# Patient Record
Sex: FEMALE | Race: WHITE | NOT HISPANIC OR LATINO | Employment: FULL TIME | URBAN - METROPOLITAN AREA
[De-identification: names, ages, dates, MRNs, and addresses within clinical notes are randomized per-mention and may not be internally consistent; named-entity substitution may affect disease eponyms.]

---

## 2020-04-27 DIAGNOSIS — Z78.0 MENOPAUSE: Primary | ICD-10-CM

## 2020-04-27 RX ORDER — ESTRADIOL 1 MG/G
GEL TOPICAL DAILY
COMMUNITY
End: 2020-10-14 | Stop reason: SDUPTHER

## 2020-04-27 RX ORDER — ESTRADIOL 1 MG/1
1 TABLET ORAL DAILY
Qty: 90 TABLET | Refills: 3 | Status: SHIPPED | OUTPATIENT
Start: 2020-04-27 | End: 2020-04-27 | Stop reason: SDUPTHER

## 2020-04-27 RX ORDER — ESTRADIOL 1 MG/1
1 TABLET ORAL DAILY
COMMUNITY
End: 2020-04-27 | Stop reason: SDUPTHER

## 2020-04-27 RX ORDER — ESTRADIOL 1 MG/1
1 TABLET ORAL DAILY
Qty: 90 TABLET | Refills: 3 | Status: SHIPPED | OUTPATIENT
Start: 2020-04-27 | End: 2020-10-07 | Stop reason: SDUPTHER

## 2020-04-27 RX ORDER — VALACYCLOVIR HYDROCHLORIDE 500 MG/1
500 TABLET, FILM COATED ORAL 2 TIMES DAILY
COMMUNITY
End: 2021-06-24 | Stop reason: SDUPTHER

## 2020-04-27 RX ORDER — PROGESTERONE 100 MG/1
100 CAPSULE ORAL DAILY
COMMUNITY
End: 2021-02-03 | Stop reason: SDUPTHER

## 2020-04-27 RX ORDER — DEXTROAMPHETAMINE SACCHARATE, AMPHETAMINE ASPARTATE, DEXTROAMPHETAMINE SULFATE AND AMPHETAMINE SULFATE 7.5; 7.5; 7.5; 7.5 MG/1; MG/1; MG/1; MG/1
TABLET ORAL
COMMUNITY
End: 2024-04-01

## 2020-04-27 RX ORDER — PHENTERMINE AND TOPIRAMATE 3.75; 23 MG/1; MG/1
CAPSULE, EXTENDED RELEASE ORAL
COMMUNITY

## 2020-04-27 NOTE — TELEPHONE ENCOUNTER
PATIENT REQUESTING REFILL ON ESTRADIOL. ALLERGIES MARKED AS REVIEWED. MEDICATION PENDING. PHARMACY UP TO DATE. PLEASE ADVISE.

## 2020-10-07 DIAGNOSIS — Z78.0 MENOPAUSE: ICD-10-CM

## 2020-10-07 RX ORDER — ESTRADIOL 1 MG/1
1 TABLET ORAL DAILY
Qty: 90 TABLET | Refills: 3 | Status: SHIPPED | OUTPATIENT
Start: 2020-10-07 | End: 2020-10-14 | Stop reason: SDUPTHER

## 2020-10-07 RX ORDER — ESTRADIOL 1 MG/1
1 TABLET ORAL DAILY
Qty: 90 TABLET | Refills: 3 | Status: CANCELLED | OUTPATIENT
Start: 2020-10-07

## 2020-10-07 NOTE — TELEPHONE ENCOUNTER
----- Message from Ronda Padilla sent at 10/7/2020  9:05 AM CDT -----  Regarding: RX Refill Request  Type:  RX Refill Request    Who Called:  Kyleigh Moore   Refill or New Rx: refill  RX Name and Strength:estradiol ,  How is the patient currently taking it? (ex. 1XDay):  Is this a 30 day or 90 day RX:  Preferred Pharmacy with phone number: [facility]  Local or Mail Order:  Ordering Provider:  Would the patient rather a call back or a response via [facility]?   Best Call Back Number:  Additional Information:

## 2020-10-13 DIAGNOSIS — Z78.0 MENOPAUSE: ICD-10-CM

## 2020-10-13 RX ORDER — ESTRADIOL 1 MG/1
1 TABLET ORAL DAILY
Qty: 90 TABLET | Refills: 3 | Status: CANCELLED | OUTPATIENT
Start: 2020-10-13

## 2020-10-14 DIAGNOSIS — Z78.0 MENOPAUSE: ICD-10-CM

## 2020-10-14 RX ORDER — ESTRADIOL 1 MG/1
1 TABLET ORAL DAILY
Qty: 90 TABLET | Refills: 3 | Status: SHIPPED | OUTPATIENT
Start: 2020-10-14 | End: 2020-10-14 | Stop reason: SDUPTHER

## 2020-10-14 RX ORDER — ESTRADIOL 1 MG/1
1 TABLET ORAL DAILY
Qty: 90 TABLET | Refills: 3 | Status: SHIPPED | OUTPATIENT
Start: 2020-10-14 | End: 2021-10-20

## 2020-10-14 RX ORDER — ESTRADIOL 1 MG/G
1 GEL TOPICAL DAILY
Qty: 90 G | Refills: 3 | Status: SHIPPED | OUTPATIENT
Start: 2020-10-14 | End: 2020-10-14

## 2021-02-03 DIAGNOSIS — Z79.890 HORMONE REPLACEMENT THERAPY (HRT): Primary | ICD-10-CM

## 2021-02-03 RX ORDER — PROGESTERONE 100 MG/1
100 CAPSULE ORAL DAILY
Qty: 90 CAPSULE | Refills: 3 | Status: SHIPPED | OUTPATIENT
Start: 2021-02-03 | End: 2022-03-07

## 2021-04-20 ENCOUNTER — OFFICE VISIT (OUTPATIENT)
Dept: OBSTETRICS AND GYNECOLOGY | Facility: CLINIC | Age: 51
End: 2021-04-20
Payer: COMMERCIAL

## 2021-04-20 VITALS
BODY MASS INDEX: 21.99 KG/M2 | DIASTOLIC BLOOD PRESSURE: 80 MMHG | HEART RATE: 69 BPM | SYSTOLIC BLOOD PRESSURE: 127 MMHG | WEIGHT: 132 LBS | HEIGHT: 65 IN

## 2021-04-20 DIAGNOSIS — F32.A ANXIETY AND DEPRESSION: ICD-10-CM

## 2021-04-20 DIAGNOSIS — Z12.11 SPECIAL SCREENING FOR MALIGNANT NEOPLASM OF COLON: ICD-10-CM

## 2021-04-20 DIAGNOSIS — Z01.419 WELL WOMAN EXAM WITH ROUTINE GYNECOLOGICAL EXAM: Primary | ICD-10-CM

## 2021-04-20 DIAGNOSIS — F41.9 ANXIETY AND DEPRESSION: ICD-10-CM

## 2021-04-20 DIAGNOSIS — Z12.31 BREAST CANCER SCREENING BY MAMMOGRAM: ICD-10-CM

## 2021-04-20 PROCEDURE — 99396 PREV VISIT EST AGE 40-64: CPT | Mod: 25,S$GLB,, | Performed by: OBSTETRICS & GYNECOLOGY

## 2021-04-20 PROCEDURE — 99396 PR PREVENTIVE VISIT,EST,40-64: ICD-10-PCS | Mod: 25,S$GLB,, | Performed by: OBSTETRICS & GYNECOLOGY

## 2021-04-20 PROCEDURE — 82274 PR  BLOOD,OCCULT,FECAL HGB,FECES,1-3 SIMULT: ICD-10-PCS | Mod: QW,S$GLB,, | Performed by: OBSTETRICS & GYNECOLOGY

## 2021-04-20 PROCEDURE — 82274 ASSAY TEST FOR BLOOD FECAL: CPT | Mod: QW,S$GLB,, | Performed by: OBSTETRICS & GYNECOLOGY

## 2021-04-20 RX ORDER — ALPRAZOLAM 0.5 MG/1
0.5 TABLET ORAL 3 TIMES DAILY PRN
Qty: 28 TABLET | Refills: 0 | Status: SHIPPED | OUTPATIENT
Start: 2021-04-20 | End: 2023-03-20

## 2021-04-20 RX ORDER — THYROID, PORCINE 30 MG/1
TABLET ORAL
COMMUNITY
Start: 2021-04-15

## 2021-04-20 RX ORDER — ESCITALOPRAM OXALATE 10 MG/1
TABLET ORAL
Qty: 90 TABLET | Refills: 3 | Status: SHIPPED | OUTPATIENT
Start: 2021-04-20 | End: 2023-03-20

## 2021-06-24 RX ORDER — VALACYCLOVIR HYDROCHLORIDE 500 MG/1
500 TABLET, FILM COATED ORAL 2 TIMES DAILY
Qty: 60 TABLET | Refills: 2 | Status: SHIPPED | OUTPATIENT
Start: 2021-06-24 | End: 2023-01-23

## 2021-12-08 ENCOUNTER — TELEPHONE (OUTPATIENT)
Dept: OBSTETRICS AND GYNECOLOGY | Facility: CLINIC | Age: 51
End: 2021-12-08
Payer: COMMERCIAL

## 2021-12-14 ENCOUNTER — OFFICE VISIT (OUTPATIENT)
Dept: OBSTETRICS AND GYNECOLOGY | Facility: CLINIC | Age: 51
End: 2021-12-14
Payer: COMMERCIAL

## 2021-12-14 VITALS
BODY MASS INDEX: 22.3 KG/M2 | HEART RATE: 73 BPM | WEIGHT: 134 LBS | DIASTOLIC BLOOD PRESSURE: 82 MMHG | SYSTOLIC BLOOD PRESSURE: 137 MMHG

## 2021-12-14 DIAGNOSIS — Z12.31 BREAST CANCER SCREENING BY MAMMOGRAM: ICD-10-CM

## 2021-12-14 DIAGNOSIS — N39.3 SUI (STRESS URINARY INCONTINENCE, FEMALE): ICD-10-CM

## 2021-12-14 DIAGNOSIS — N76.2 ACUTE VULVITIS: Primary | ICD-10-CM

## 2021-12-14 PROCEDURE — 99213 PR OFFICE/OUTPT VISIT, EST, LEVL III, 20-29 MIN: ICD-10-PCS | Mod: S$GLB,,, | Performed by: OBSTETRICS & GYNECOLOGY

## 2021-12-14 PROCEDURE — 99213 OFFICE O/P EST LOW 20 MIN: CPT | Mod: S$GLB,,, | Performed by: OBSTETRICS & GYNECOLOGY

## 2021-12-14 RX ORDER — CLOBETASOL PROPIONATE 0.5 MG/G
OINTMENT TOPICAL 2 TIMES DAILY
Qty: 15 G | Refills: 0 | Status: SHIPPED | OUTPATIENT
Start: 2021-12-14 | End: 2023-03-20

## 2022-08-02 ENCOUNTER — TELEPHONE (OUTPATIENT)
Dept: OBSTETRICS AND GYNECOLOGY | Facility: CLINIC | Age: 52
End: 2022-08-02
Payer: COMMERCIAL

## 2022-08-02 NOTE — TELEPHONE ENCOUNTER
Attempted to contact patient, no answer, left voicemail advising patient to go to urgent care as Dr Phillips is out of the office this week. Requested a call back if patient had any further questions.

## 2022-08-02 NOTE — TELEPHONE ENCOUNTER
----- Message from Stephen Barton sent at 8/2/2022 11:12 AM CDT -----  Contact: Patient  Patient need a sooner appointment.  Patient has a possible UTI or bladder infection    Please call patient to schedule   Call back # [phone]

## 2022-08-05 ENCOUNTER — TELEPHONE (OUTPATIENT)
Dept: OBSTETRICS AND GYNECOLOGY | Facility: CLINIC | Age: 52
End: 2022-08-05
Payer: COMMERCIAL

## 2022-08-05 NOTE — TELEPHONE ENCOUNTER
----- Message from Nanette Beard sent at 8/5/2022  1:47 PM CDT -----  pt needs call back to schedule procedure, discussed awhile back...[phone] (home)

## 2022-08-08 DIAGNOSIS — N39.3 SUI (STRESS URINARY INCONTINENCE, FEMALE): Primary | ICD-10-CM

## 2023-03-20 ENCOUNTER — OFFICE VISIT (OUTPATIENT)
Dept: OBSTETRICS AND GYNECOLOGY | Facility: CLINIC | Age: 53
End: 2023-03-20
Payer: COMMERCIAL

## 2023-03-20 ENCOUNTER — TELEPHONE (OUTPATIENT)
Dept: GASTROENTEROLOGY | Facility: CLINIC | Age: 53
End: 2023-03-20

## 2023-03-20 VITALS
WEIGHT: 137 LBS | HEIGHT: 65 IN | HEART RATE: 79 BPM | SYSTOLIC BLOOD PRESSURE: 142 MMHG | BODY MASS INDEX: 22.82 KG/M2 | DIASTOLIC BLOOD PRESSURE: 92 MMHG

## 2023-03-20 DIAGNOSIS — Z12.31 BREAST CANCER SCREENING BY MAMMOGRAM: ICD-10-CM

## 2023-03-20 DIAGNOSIS — Z12.11 SPECIAL SCREENING FOR MALIGNANT NEOPLASM OF COLON: ICD-10-CM

## 2023-03-20 DIAGNOSIS — Z01.419 WELL WOMAN EXAM WITH ROUTINE GYNECOLOGICAL EXAM: ICD-10-CM

## 2023-03-20 DIAGNOSIS — E03.8 OTHER SPECIFIED HYPOTHYROIDISM: ICD-10-CM

## 2023-03-20 DIAGNOSIS — N39.3 SUI (STRESS URINARY INCONTINENCE, FEMALE): ICD-10-CM

## 2023-03-20 DIAGNOSIS — Z12.11 COLON CANCER SCREENING: Primary | ICD-10-CM

## 2023-03-20 PROCEDURE — 99396 PR PREVENTIVE VISIT,EST,40-64: ICD-10-PCS | Mod: S$GLB,,, | Performed by: OBSTETRICS & GYNECOLOGY

## 2023-03-20 PROCEDURE — 99396 PREV VISIT EST AGE 40-64: CPT | Mod: S$GLB,,, | Performed by: OBSTETRICS & GYNECOLOGY

## 2023-03-20 RX ORDER — ZOLPIDEM TARTRATE 10 MG/1
TABLET ORAL
COMMUNITY
Start: 2023-02-06

## 2023-03-20 RX ORDER — HYDROCHLOROTHIAZIDE 12.5 MG/1
12.5 TABLET ORAL EVERY MORNING
COMMUNITY
Start: 2023-03-13

## 2023-03-20 NOTE — PROGRESS NOTES
Subjective:       Patient ID: Kyleigh Moore is a 52 y.o. female.    Chief Complaint:  Well Woman      History of Present Illness  She is doing well.  No new health issues,  No abnormal bleeding, No GI  concerns,  No dyspareunia.   Doing well but she has PARISH with activity cough running or laugh   she is interested in a sling.  She is on thyroid and would like to check her labs    she will do this    finally would like to get a colon evaluation.   No dyspareunia.    HPI      GYN & OB History  No LMP recorded. Patient has had a hysterectomy.     Date of Last Pap: No result found    OB History   No obstetric history on file.       Review of Systems  Review of Systems   Unable to perform ROS  Genitourinary:  Positive for bladder incontinence.           Objective:    Physical Exam:   Constitutional: She is oriented to person, place, and time. She appears well-developed.    HENT:   Head: Normocephalic.     Neck: Trachea normal. No thyromegaly present.    Cardiovascular:  Normal rate, regular rhythm and normal heart sounds.             Pulmonary/Chest: Effort normal and breath sounds normal. Right breast exhibits no mass, no nipple discharge and no skin change. Left breast exhibits no mass, no nipple discharge and no skin change.        Abdominal: Soft. There is no abdominal tenderness. There is no rigidity and no guarding.     Genitourinary:    Vagina and rectum normal.   Rectum:      Guaiac result negative.   Guaiac negative stool.    Pelvic exam was performed with patient supine.   There is no lesion on the right labia. There is no lesion on the left labia. Right adnexum displays no mass and no tenderness. Left adnexum displays no mass and no tenderness. No  no vaginal discharge in the vagina. Cervix is absent.Uterus is absent.    Genitourinary Comments: Hyper mobile urethra on cough.                   Lymphadenopathy:        Head (right side): No submental and no submandibular adenopathy present.        Head (left  side): No submental and no submandibular adenopathy present.     She has no cervical adenopathy.    Neurological: She is alert and oriented to person, place, and time.    Skin: Skin is warm.    Psychiatric: She has a normal mood and affect. Her speech is normal and behavior is normal. Thought content normal.        Assessment:        1. Colon cancer screening    2. Breast cancer screening by mammogram    3. Well woman exam with routine gynecological exam    4. Special screening for malignant neoplasm of colon    5. PARISH (stress urinary incontinence, female)    6. Other specified hypothyroidism               Plan:             Pap   Mammogram  Follow up one year or sooner if needed  Self breast exams  Consider health profile if labs not done with PCP  Recommend colonoscopy as indicated  Bone density discussed   Pt is aware we call all results. If she does not hear from our office regarding her result within a week of having a study or procedure performed she is to call the office so that we can research the result for her as I do not know when she is scheduling her procedures.   Chaperone was present

## 2023-03-24 LAB — Lab: NORMAL

## 2023-03-29 RX ORDER — FLUCONAZOLE 150 MG/1
150 TABLET ORAL EVERY OTHER DAY
Qty: 2 TABLET | Refills: 2 | Status: SHIPPED | OUTPATIENT
Start: 2023-03-29 | End: 2023-04-01

## 2023-05-02 ENCOUNTER — TELEPHONE (OUTPATIENT)
Dept: GASTROENTEROLOGY | Facility: CLINIC | Age: 53
End: 2023-05-02
Payer: COMMERCIAL

## 2023-05-02 NOTE — TELEPHONE ENCOUNTER
----- Message from Codey Muniz MA sent at 5/1/2023  9:20 AM CDT -----  Contact: Kyleigh    ----- Message -----  From: Concha Baez  Sent: 4/25/2023   8:32 AM CDT  To: [facility] Procedure Scheduling    Kyleigh needs a call back at [phone], Regards to she is ready to schedule her colonoscopy with referral.    Thanks  Td

## 2023-05-03 ENCOUNTER — TELEPHONE (OUTPATIENT)
Dept: GASTROENTEROLOGY | Facility: CLINIC | Age: 53
End: 2023-05-03
Payer: COMMERCIAL

## 2023-05-03 VITALS — BODY MASS INDEX: 21.66 KG/M2 | WEIGHT: 130 LBS | HEIGHT: 65 IN

## 2023-05-03 DIAGNOSIS — Z12.11 SCREENING FOR COLON CANCER: Primary | ICD-10-CM

## 2023-05-03 NOTE — ADDENDUM NOTE
Addended by: FLORIDALMA BURGOS on: 5/3/2023 08:12 AM     Modules accepted: Orders, Level of Service

## 2023-05-03 NOTE — TELEPHONE ENCOUNTER
Returned call to pt and got chart updated and scheduled. Pt voiced no hx of cpcp, heart stent or walking problem. Pt voiced understood information will be emailed and link

## 2023-05-03 NOTE — TELEPHONE ENCOUNTER
----- Message from Bethanie Alberto sent at 5/3/2023  8:27 AM CDT -----  Regarding: Return Call  Contact: patient  Type:  Patient Returning Call    Who Called:Kyleigh   Who Left Message for Patient: nurse   Does the patient know what this is regarding?: scheduling colonoscopy  Would the patient rather a call back or a response via [facility]? Call back   Best Call Back Number: [phone] (home)     Additional Information:     CAMILLE Russ

## 2023-05-03 NOTE — TELEPHONE ENCOUNTER
----- Message from Dilia Olson LPN sent at 5/2/2023  2:53 PM CDT -----  Contact: pt  Returning call to be scheduled.    ----- Message -----  From: Susanne Vee  Sent: 5/2/2023   2:49 PM CDT  To: Valdemar TINOCO Staff    Pt returning a missed call and can be reached at [phone].    Thanks,

## 2023-05-14 DIAGNOSIS — Z79.890 HORMONE REPLACEMENT THERAPY (HRT): ICD-10-CM

## 2023-05-15 RX ORDER — SOD SULF/POT CHLORIDE/MAG SULF 1.479 G
12 TABLET ORAL DAILY
Qty: 24 TABLET | Refills: 0 | Status: SHIPPED | OUTPATIENT
Start: 2023-05-15

## 2023-05-16 RX ORDER — PROGESTERONE 100 MG/1
CAPSULE ORAL
Qty: 30 CAPSULE | Refills: 0 | Status: SHIPPED | OUTPATIENT
Start: 2023-05-16 | End: 2023-06-20

## 2023-06-19 DIAGNOSIS — Z79.890 HORMONE REPLACEMENT THERAPY (HRT): ICD-10-CM

## 2023-06-20 RX ORDER — PROGESTERONE 100 MG/1
CAPSULE ORAL
Qty: 90 CAPSULE | Refills: 3 | Status: SHIPPED | OUTPATIENT
Start: 2023-06-20 | End: 2024-04-01

## 2023-06-22 VITALS — WEIGHT: 130 LBS | HEIGHT: 65 IN | BODY MASS INDEX: 21.66 KG/M2

## 2023-06-22 DIAGNOSIS — Z12.11 SCREENING FOR MALIGNANT NEOPLASM OF COLON: Primary | ICD-10-CM

## 2023-06-22 NOTE — PROGRESS NOTES
"[facility]  [address] [place] [address]  Phone: [phone]  Fax: [phone]    History & Physical         Provider: Dr. Helen Aldrich    Patient Name: Kyleigh ARENAS (age):1970  52 y.o.           Gender: female   Phone: [phone]     Referring Physician: Black Macdonald Iii     Vital Signs:   Height - 5' 5"  Weight -  130 lb  BMI -  21.63    Plan: Colonoscopy @ [facility]    Encounter Diagnosis   Name Primary?    Screening for malignant neoplasm of colon Yes           History:      Past Medical History:   Diagnosis Date    ADD (attention deficit disorder)     Anxiety     BMI 21.0-21.9, adult     Hormone replacement therapy (HRT)     HSV infection     Hypertension     Thyroid disease       Past Surgical History:   Procedure Laterality Date    COLONOSCOPY  2013    HYSTERECTOMY      KNEE SURGERY Right     x2    SURGICAL REMOVAL OF LYMPH NODE      WISDOM TOOTH EXTRACTION        Medication List with Changes/Refills   Current Medications    ARMOUR THYROID 30 MG TAB        DEXTROAMPHETAMINE-AMPHETAMINE 30 MG TAB    Take by mouth.    ESTRADIOL (ESTRACE) 1 MG TABLET    Take 1 tablet (1 mg total) by mouth once daily.    HYDROCHLOROTHIAZIDE (HYDRODIURIL) 12.5 MG TAB    Take 12.5 mg by mouth every morning.    NEBIVOLOL HCL (BYSTOLIC ORAL)    Take by mouth.    PHENTERMINE-TOPIRAMATE (QSYMIA) 3.75-23 MG CM24    Take by mouth.     PHENTERMINE-TOPIRAMATE (QSYMIA) 7.5-46 MG CM24    Take by mouth.    PROGESTERONE (PROMETRIUM) 100 MG CAPSULE    TAKE 1 CAPSULE(100 MG) BY MOUTH EVERY DAY    SOD SULF-POT CHLORIDE-MAG SULF (SUTAB) 1.479-0.188- 0.225 GRAM TABLET    Take 12 tablets by mouth once daily. Take according to package instructions with indicated amount of water. No breakfast day before test. May substitute with Suprep, Clenpiq, Plenvu, Moviprep or GoLytely based on Rx plan and patient preference.    " VALACYCLOVIR (VALTREX) 500 MG TABLET    TAKE 1 TABLET(500 MG) BY MOUTH TWICE DAILY    ZOLPIDEM (AMBIEN) 10 MG TAB          Review of patient's allergies indicates:  No Known Allergies   Family History   Problem Relation Age of Onset    Brain cancer Mother 52    Heart attack Father 67    Liver disease Sister 37    No Known Problems Maternal Grandmother     No Known Problems Maternal Grandfather     No Known Problems Paternal Grandmother     No Known Problems Paternal Grandfather       Social History     Tobacco Use    Smoking status: Never    Smokeless tobacco: Never   Substance Use Topics    Alcohol use: Yes     Alcohol/week: 3.0 standard drinks     Types: 3 Glasses of wine per week    Drug use: Never        Physical Examination:     General Appearance:___________________________  HEENT: _____________________________________  Abdomen:____________________________________  Heart:________________________________________  Lungs:_______________________________________  Extremities:___________________________________  Skin:_________________________________________  Endocrine:____________________________________  Genitourinary:_________________________________  Neurological:__________________________________      Patient has been evaluated immediately prior to sedation and is medically cleared for endoscopy with IVCS as an ASA class: ______      Physician Signature: _________________________       Date: ________  Time: ________

## 2023-06-29 ENCOUNTER — OUTSIDE PLACE OF SERVICE (OUTPATIENT)
Dept: GASTROENTEROLOGY | Facility: CLINIC | Age: 53
End: 2023-06-29

## 2023-06-29 LAB — CRC RECOMMENDATION EXT: NORMAL

## 2023-06-29 PROCEDURE — G0121 COLON CA SCRN NOT HI RSK IND: ICD-10-PCS | Mod: ,,, | Performed by: INTERNAL MEDICINE

## 2023-06-29 PROCEDURE — G0121 COLON CA SCRN NOT HI RSK IND: HCPCS | Mod: ,,, | Performed by: INTERNAL MEDICINE

## 2023-12-15 ENCOUNTER — DOCUMENTATION ONLY (OUTPATIENT)
Dept: GASTROENTEROLOGY | Facility: CLINIC | Age: 53
End: 2023-12-15

## 2024-02-12 DIAGNOSIS — B00.1 RECURRENT COLD SORES: ICD-10-CM

## 2024-02-13 RX ORDER — VALACYCLOVIR HYDROCHLORIDE 500 MG/1
TABLET, FILM COATED ORAL
Qty: 60 TABLET | Refills: 2 | Status: SHIPPED | OUTPATIENT
Start: 2024-02-13

## 2024-02-23 DIAGNOSIS — Z78.0 MENOPAUSE: ICD-10-CM

## 2024-02-26 RX ORDER — ESTRADIOL 1 MG/1
1 TABLET ORAL DAILY
Qty: 90 TABLET | Refills: 3 | Status: SHIPPED | OUTPATIENT
Start: 2024-02-26 | End: 2024-04-01 | Stop reason: SDUPTHER

## 2024-04-01 ENCOUNTER — OFFICE VISIT (OUTPATIENT)
Dept: OBSTETRICS AND GYNECOLOGY | Facility: CLINIC | Age: 54
End: 2024-04-01
Payer: COMMERCIAL

## 2024-04-01 VITALS
DIASTOLIC BLOOD PRESSURE: 80 MMHG | HEART RATE: 76 BPM | SYSTOLIC BLOOD PRESSURE: 116 MMHG | BODY MASS INDEX: 23.43 KG/M2 | WEIGHT: 140.63 LBS | HEIGHT: 65 IN

## 2024-04-01 DIAGNOSIS — Z12.31 BREAST CANCER SCREENING BY MAMMOGRAM: Primary | ICD-10-CM

## 2024-04-01 DIAGNOSIS — Z01.419 WELL WOMAN EXAM WITH ROUTINE GYNECOLOGICAL EXAM: ICD-10-CM

## 2024-04-01 DIAGNOSIS — N39.3 SUI (STRESS URINARY INCONTINENCE, FEMALE): ICD-10-CM

## 2024-04-01 DIAGNOSIS — Z78.0 MENOPAUSE: ICD-10-CM

## 2024-04-01 PROCEDURE — 99396 PREV VISIT EST AGE 40-64: CPT | Mod: S$GLB,,, | Performed by: OBSTETRICS & GYNECOLOGY

## 2024-04-01 RX ORDER — ESTRADIOL 1 MG/1
1 TABLET ORAL DAILY
Qty: 90 TABLET | Refills: 3 | Status: SHIPPED | OUTPATIENT
Start: 2024-04-01

## 2024-04-01 RX ORDER — NEBIVOLOL 10 MG/1
10 TABLET ORAL
COMMUNITY

## 2024-04-01 RX ORDER — DEXTROAMPHETAMINE SACCHARATE, AMPHETAMINE ASPARTATE MONOHYDRATE, DEXTROAMPHETAMINE SULFATE AND AMPHETAMINE SULFATE 6.25; 6.25; 6.25; 6.25 MG/1; MG/1; MG/1; MG/1
CAPSULE, EXTENDED RELEASE ORAL
COMMUNITY
Start: 2024-03-07

## 2024-04-01 NOTE — PROGRESS NOTES
Subjective:       Patient ID: Kyleigh Moore is a 53 y.o. female.    Chief Complaint:  Well Woman      History of Present Illness  She is doing well.  No new health issues,  No abnormal bleeding, No GI or Gu concerns,  No dyspareunia.   Rare hot flashes.  She is on progesterone and estradiol  she tried the pellets and did not like   she had the progesterone increased and she could not stay up.  She was also told to go up on her thyroid. She also had  testosterone.    She did have urinary incontinence.   With activity.  She is no long running secondary to her knees  she has some urgency.  Her colon eval was this past summer   she is on HRT   on ambien   she has shoulder and back pain from her breast   HPI      GYN & OB History  No LMP recorded. Patient has had a hysterectomy.     Date of Last Pap: No result found    OB History   No obstetric history on file.       Review of Systems  Review of Systems   Constitutional:  Negative for activity change.   Eyes:  Negative for visual disturbance.   Respiratory:  Negative for shortness of breath.    Cardiovascular:  Negative for chest pain.   Gastrointestinal:  Negative for abdominal pain.   Genitourinary:  Negative for vaginal bleeding.        No abnormal vaginal bleeding   Musculoskeletal:  Negative for back pain.   Integumentary:  Negative for rash and breast mass.   Neurological:  Negative for numbness.   Psychiatric/Behavioral:          No mood disturbance or changes    Breast: Negative for mass            Objective:    Physical Exam:   Constitutional: She is oriented to person, place, and time. She appears well-developed.    HENT:   Head: Normocephalic.     Neck: Trachea normal. No thyromegaly present.    Cardiovascular:  Normal rate, regular rhythm and normal heart sounds.             Pulmonary/Chest: Effort normal and breath sounds normal. Right breast exhibits no mass, no nipple discharge and no skin change. Left breast exhibits no mass, no nipple discharge and  no skin change.   Mild fibrocystic changes    During the exam self breast exam discussed         Abdominal: Soft. There is no abdominal tenderness. There is no rigidity and no guarding.     Genitourinary:    Vagina normal.      Pelvic exam was performed with patient supine.     Labial bartholins normal.There is no lesion on the right labia. There is no lesion on the left labia. Right adnexum displays no mass and no tenderness. Left adnexum displays no mass and no tenderness. Cervix is absent.Uterus is absent.              Lymphadenopathy:        Head (right side): No submental and no submandibular adenopathy present.        Head (left side): No submental and no submandibular adenopathy present.     She has no cervical adenopathy.    Neurological: She is alert and oriented to person, place, and time.    Skin: Skin is warm.    Psychiatric: She has a normal mood and affect. Her speech is normal and behavior is normal. Thought content normal.    Shoulder indention from her back      Assessment:        1. Breast cancer screening by mammogram    2. Well woman exam with routine gynecological exam    3. PARISH (stress urinary incontinence, female)               Plan:         Consider stopping the progesterone      Pap preformed as she had an ASCUS     she has PARISH at times   she will let me know when she is ready for surgery.  Conservative approaches discussed   she does have upper back concerns and shoulder secondary to her large breast   consider seeing a breast surgeon.    Mammogram  Follow up one year or sooner if needed  Self breast exams  Consider health profile if labs not done with PCP  Recommend colonoscopy as indicated  Bone density discussed   Pt is aware we call all results. If she does not hear from our office regarding her result within a week of having a study or procedure performed she is to call the office so that we can research the result for her as I do not know when she is scheduling her procedures.    Chaperone was present

## 2024-04-02 LAB — Lab: NORMAL

## 2024-04-24 ENCOUNTER — TELEPHONE (OUTPATIENT)
Dept: OBSTETRICS AND GYNECOLOGY | Facility: CLINIC | Age: 54
End: 2024-04-24
Payer: COMMERCIAL

## 2024-04-24 DIAGNOSIS — N39.3 SUI (STRESS URINARY INCONTINENCE, FEMALE): Primary | ICD-10-CM

## 2024-04-24 NOTE — TELEPHONE ENCOUNTER
LM on patient's voicemail advising her that Jaqueline will be calling her to get the surgery scheduled.

## 2024-04-24 NOTE — TELEPHONE ENCOUNTER
----- Message from Bethanie Jack sent at 4/24/2024  3:58 PM CDT -----  Regarding: Procedure  Contact: patient  Per phone call with patient, she stated that the bladder procedure and she wants to go ahead and schedule it.  Please return call at [phone].    Thanks,  SJ

## 2024-04-25 ENCOUNTER — TELEPHONE (OUTPATIENT)
Dept: OBSTETRICS AND GYNECOLOGY | Facility: CLINIC | Age: 54
End: 2024-04-25
Payer: COMMERCIAL

## 2024-04-25 NOTE — TELEPHONE ENCOUNTER
----- Message from Evelin Clemente MA sent at 4/24/2024  4:57 PM CDT -----  Contact: self    ----- Message -----  From: Rosemarie Pickett  Sent: 4/24/2024   4:56 PM CDT  To: Alan Asencio III Staff    Type: Staff Message    Caller: Kyleigh Moore  Call Back Number: [phone]  Nature of the Call: #pt calling to speak with Jaqueline  Additional Information: na

## 2024-05-09 ENCOUNTER — TELEPHONE (OUTPATIENT)
Dept: OBSTETRICS AND GYNECOLOGY | Facility: CLINIC | Age: 54
End: 2024-05-09
Payer: COMMERCIAL

## 2024-05-09 NOTE — TELEPHONE ENCOUNTER
----- Message from Iza Elam MA sent at 5/9/2024 10:09 AM CDT -----  Contact: self    ----- Message -----  From: Marylu Tapia  Sent: 5/9/2024  10:03 AM CDT  To: Alan Asencio III Staff    Patient is requesting a call back regarding rescheduling procedure that's on 7/3 - would like to be scheduled for 7/31. Please call back at [phone]

## 2024-08-01 ENCOUNTER — OFFICE VISIT (OUTPATIENT)
Dept: OBSTETRICS AND GYNECOLOGY | Facility: CLINIC | Age: 54
End: 2024-08-01
Payer: COMMERCIAL

## 2024-08-01 VITALS
HEART RATE: 88 BPM | DIASTOLIC BLOOD PRESSURE: 78 MMHG | WEIGHT: 128 LBS | HEIGHT: 65 IN | SYSTOLIC BLOOD PRESSURE: 111 MMHG | BODY MASS INDEX: 21.33 KG/M2

## 2024-08-01 DIAGNOSIS — N39.3 SUI (STRESS URINARY INCONTINENCE, FEMALE): Primary | ICD-10-CM

## 2024-08-01 DIAGNOSIS — N99.89 POSTOPERATIVE VOIDING DIFFICULTY: Primary | ICD-10-CM

## 2024-08-01 DIAGNOSIS — Z98.890 POST-OPERATIVE STATE: ICD-10-CM

## 2024-08-01 DIAGNOSIS — R39.198 POSTOPERATIVE VOIDING DIFFICULTY: Primary | ICD-10-CM

## 2024-08-01 LAB
ANION GAP SERPL CALC-SCNC: 7 MMOL/L (ref 3–11)
APPEARANCE, UA: CLEAR
BACTERIA SPEC CULT: ABNORMAL /HPF
BASOPHILS NFR BLD: 0.4 % (ref 0–3)
BILIRUB UR QL STRIP: NEGATIVE MG/DL
BUN SERPL-MCNC: 17 MG/DL (ref 7–18)
BUN/CREAT SERPL: 21.25 RATIO (ref 7–18)
CALCIUM SERPL-MCNC: 9 MG/DL (ref 8.8–10.5)
CHLORIDE SERPL-SCNC: 100 MMOL/L (ref 100–108)
CO2 SERPL-SCNC: 29 MMOL/L (ref 21–32)
COLOR UR: ABNORMAL
CREAT SERPL-MCNC: 0.8 MG/DL (ref 0.55–1.02)
EOSINOPHIL NFR BLD: 2.4 % (ref 1–3)
ERYTHROCYTE [DISTWIDTH] IN BLOOD BY AUTOMATED COUNT: 12.4 % (ref 12.5–18)
GFR ESTIMATION: > 60
GLUCOSE (UA): NORMAL MG/DL
GLUCOSE SERPL-MCNC: 97 MG/DL (ref 70–110)
HCT VFR BLD AUTO: 35.6 % (ref 37–47)
HGB BLD-MCNC: 12.4 G/DL (ref 12–16)
HGB UR QL STRIP: 25 /UL
KETONES UR QL STRIP: NEGATIVE MG/DL
LEUKOCYTE ESTERASE UR QL STRIP: NEGATIVE /UL
LYMPHOCYTES NFR BLD: 17.2 % (ref 25–40)
MCH RBC QN AUTO: 30.3 PG (ref 27–31.2)
MCHC RBC AUTO-ENTMCNC: 34.8 G/DL (ref 31.8–35.4)
MCV RBC AUTO: 87 FL (ref 80–97)
MONOCYTES NFR BLD: 6.1 % (ref 1–15)
NEUTROPHILS # BLD AUTO: 7.03 10*3/UL (ref 1.8–7.7)
NEUTROPHILS NFR BLD: 73.5 % (ref 37–80)
NITRITE UR QL STRIP: NEGATIVE
NUCLEATED RED BLOOD CELLS: 0 %
PH UR STRIP: 6 PH (ref 5–9)
PLATELETS: 377 10*3/UL (ref 142–424)
POTASSIUM SERPL-SCNC: 3.5 MMOL/L (ref 3.6–5.2)
PROT UR QL STRIP: ABNORMAL MG/DL
RBC # BLD AUTO: 4.09 10*6/UL (ref 4.2–5.4)
RBC #/AREA URNS HPF: ABNORMAL /HPF (ref 0–2)
SERVICE COMMENT 03: ABNORMAL
SODIUM BLD-SCNC: 136 MMOL/L (ref 135–145)
SP GR UR STRIP: 1.02 (ref 1–1.03)
SPECIMEN COLLECTION METHOD, URINE: ABNORMAL
SQUAMOUS EPITHELIAL, UA: ABNORMAL /LPF
UROBILINOGEN UR STRIP-ACNC: NORMAL MG/DL
WBC # BLD: 9.6 10*3/UL (ref 4.6–10.2)
WBC #/AREA URNS HPF: ABNORMAL /HPF (ref 0–5)

## 2024-08-01 PROCEDURE — 99213 OFFICE O/P EST LOW 20 MIN: CPT | Mod: S$GLB,,, | Performed by: OBSTETRICS & GYNECOLOGY

## 2024-08-01 RX ORDER — OXYCODONE AND ACETAMINOPHEN 5; 325 MG/1; MG/1
1 TABLET ORAL EVERY 4 HOURS PRN
Qty: 28 TABLET | Refills: 0 | Status: SHIPPED | OUTPATIENT
Start: 2024-08-01

## 2024-08-01 RX ORDER — PROGESTERONE 100 MG/1
CAPSULE ORAL
COMMUNITY
Start: 2024-05-09

## 2024-08-01 RX ORDER — PROMETHAZINE HYDROCHLORIDE 12.5 MG/1
12.5 TABLET ORAL 4 TIMES DAILY
Qty: 8 TABLET | Refills: 1 | Status: SHIPPED | OUTPATIENT
Start: 2024-08-01

## 2024-08-01 RX ORDER — IBUPROFEN 600 MG/1
600 TABLET ORAL 3 TIMES DAILY
Qty: 18 TABLET | Refills: 1 | Status: SHIPPED | OUTPATIENT
Start: 2024-08-01

## 2024-08-01 RX ORDER — NITROFURANTOIN 25; 75 MG/1; MG/1
100 CAPSULE ORAL 2 TIMES DAILY
Qty: 14 CAPSULE | Refills: 0 | Status: SHIPPED | OUTPATIENT
Start: 2024-08-01 | End: 2024-08-08

## 2024-08-01 NOTE — PROGRESS NOTES
Subjective  Patient ID: Kyleigh Moore is a 53 y.o. female.     Chief Complaint:  PARISH        History of Present Illness  She has worsening PARISH with activity  she is ready to move forward with a procedure and here to discuss     HPI        GYN & OB History  No LMP recorded. Patient has had a hysterectomy.      Date of Last Pap: No result found     OB History   No obstetric history on file.         Review of Systems  Review of Systems   Constitutional:  Negative for activity change.   Eyes:  Negative for visual disturbance.   Respiratory:  Negative for shortness of breath.    Cardiovascular:  Negative for chest pain.   Gastrointestinal:  Negative for abdominal pain.   Genitourinary:  Negative for vaginal bleeding.        No abnormal vaginal bleeding   Musculoskeletal:  Negative for back pain.   Integumentary:  Negative for rash and breast mass.   Neurological:  Negative for numbness.   Psychiatric/Behavioral:          No mood disturbance or changes    Breast: Negative for mass                 Objective:      Objective  Physical Exam:   Constitutional: She is oriented to person, place, and time. She appears well-developed.    HENT:   Head: Normocephalic.     Neck: Trachea normal. No thyromegaly present.    Cardiovascular:  Normal rate, regular rhythm and normal heart sounds.             Pulmonary/Chest: Effort normal and breath sounds normal. Right breast exhibits no mass, no nipple discharge and no skin change. Left breast exhibits no mass, no nipple discharge and no skin change.   Mild fibrocystic changes    During the exam self breast exam discussed          Abdominal: Soft. There is no abdominal tenderness. There is no rigidity and no guarding.     Genitourinary:    Vagina normal.      Pelvic exam was performed with patient supine.      Labial bartholins normal.There is no lesion on the right labia. There is no lesion on the left labia. Right adnexum displays no mass and no tenderness. Left adnexum displays  no mass and no tenderness. Cervix is absent.Uterus is absent she has a hyper mobile urethra               Lymphadenopathy:        Head (right side): No submental and no submandibular adenopathy present.        Head (left side): No submental and no submandibular adenopathy present.     She has no cervical adenopathy.    Neurological: She is alert and oriented to person, place, and time.    Skin: Skin is warm.    Psychiatric: She has a normal mood and affect. Her speech is normal and behavior is normal. Thought content normal.    Shoulder indention from her back        Assessment:         Assess  1. PARISH (stress urinary incontinence, female)       Plan.    TVTO  mid urethra sling  cysto.

## 2024-08-02 LAB
RPR: NON REACTIVE
URINE CULTURE, ROUTINE: NORMAL

## 2024-08-07 ENCOUNTER — OUTSIDE PLACE OF SERVICE (OUTPATIENT)
Dept: OBSTETRICS AND GYNECOLOGY | Facility: CLINIC | Age: 54
End: 2024-08-07
Payer: COMMERCIAL

## 2024-08-08 ENCOUNTER — TELEPHONE (OUTPATIENT)
Dept: OBSTETRICS AND GYNECOLOGY | Facility: CLINIC | Age: 54
End: 2024-08-08
Payer: COMMERCIAL

## 2024-08-08 ENCOUNTER — PATIENT MESSAGE (OUTPATIENT)
Dept: OBSTETRICS AND GYNECOLOGY | Facility: CLINIC | Age: 54
End: 2024-08-08
Payer: COMMERCIAL

## 2024-08-09 ENCOUNTER — PROCEDURE VISIT (OUTPATIENT)
Dept: OBSTETRICS AND GYNECOLOGY | Facility: CLINIC | Age: 54
End: 2024-08-09
Payer: COMMERCIAL

## 2024-08-09 DIAGNOSIS — N99.89 POSTOPERATIVE VOIDING DIFFICULTY: ICD-10-CM

## 2024-08-09 DIAGNOSIS — R39.198 POSTOPERATIVE VOIDING DIFFICULTY: ICD-10-CM

## 2024-08-09 PROCEDURE — 76856 US EXAM PELVIC COMPLETE: CPT | Mod: ,,, | Performed by: OBSTETRICS & GYNECOLOGY

## 2025-06-02 DIAGNOSIS — Z78.0 MENOPAUSE: ICD-10-CM

## 2025-06-03 RX ORDER — ESTRADIOL 1 MG/1
1 TABLET ORAL DAILY
Qty: 90 TABLET | Refills: 3 | Status: SHIPPED | OUTPATIENT
Start: 2025-06-03

## 2025-06-03 RX ORDER — ESTRADIOL 1 MG/1
TABLET ORAL
Qty: 90 TABLET | Refills: 3 | OUTPATIENT
Start: 2025-06-03